# Patient Record
Sex: MALE
[De-identification: names, ages, dates, MRNs, and addresses within clinical notes are randomized per-mention and may not be internally consistent; named-entity substitution may affect disease eponyms.]

---

## 2019-10-10 PROBLEM — Z00.00 ENCOUNTER FOR PREVENTIVE HEALTH EXAMINATION: Status: ACTIVE | Noted: 2019-10-10

## 2019-10-15 ENCOUNTER — APPOINTMENT (OUTPATIENT)
Dept: ORTHOPEDIC SURGERY | Facility: CLINIC | Age: 34
End: 2019-10-15
Payer: COMMERCIAL

## 2019-10-15 VITALS — HEIGHT: 70 IN | WEIGHT: 176 LBS | BODY MASS INDEX: 25.2 KG/M2

## 2019-10-15 DIAGNOSIS — M54.5 LOW BACK PAIN: ICD-10-CM

## 2019-10-15 DIAGNOSIS — M54.16 RADICULOPATHY, LUMBAR REGION: ICD-10-CM

## 2019-10-15 PROCEDURE — 99204 OFFICE O/P NEW MOD 45 MIN: CPT

## 2019-10-15 NOTE — DISCUSSION/SUMMARY
[de-identified] : This patient seems to have sciatica. I recommended getting an MRI. He's had a previous problem 6 years ago. I will call him with the results. He may need formal physical therapy.

## 2019-10-15 NOTE — REVIEW OF SYSTEMS
[Arthralgia] : arthralgia [Joint Pain] : no joint pain [Joint Swelling] : no joint swelling [Joint Stiffness] : joint stiffness [Negative] : Heme/Lymph

## 2019-10-15 NOTE — PHYSICAL EXAM
[Normal] : Gait: normal [de-identified] : Lumbar spine exam shows no warmth or swelling and no tenderness. There is a positive straight leg raising for hamstring and calf pain. Sensory examination is decreased sensation along the lateral aspect of thigh and calf. No motor weakness deep tendon reflexes are 2+ and symmetric there is no pain over the hamstring neurovascular exam is otherwise normal

## 2019-10-15 NOTE — HISTORY OF PRESENT ILLNESS
[de-identified] : Location: right hamstring\par Quality: shooting, burning\par Duration: 08/15/2019\par Context: Atraumatic\par Aggravating Factors: walking, bending, lying down, sitting \par Conservative treatment: OTC PT ( Last session 2 weeks ago) \par Associated Symptoms: Stiffness , numbness ,tingling\par Prior Studies: n.a\par 6 years ago had a back problem treated with injections while in [facility]\par

## 2019-10-31 ENCOUNTER — APPOINTMENT (OUTPATIENT)
Dept: ORTHOPEDIC SURGERY | Facility: CLINIC | Age: 34
End: 2019-10-31
Payer: COMMERCIAL

## 2019-10-31 DIAGNOSIS — S76.301S UNSPECIFIED INJURY OF MUSCLE, FASCIA AND TENDON OF THE POSTERIOR MUSCLE GROUP AT THIGH LEVEL, RIGHT THIGH, SEQUELA: ICD-10-CM

## 2019-10-31 PROCEDURE — 99213 OFFICE O/P EST LOW 20 MIN: CPT

## 2019-10-31 NOTE — PHYSICAL EXAM
[de-identified] : Right leg shows some tenderness in the midsubstance of the hamstring are 5/5 strength. Neurovascular exam is normal.

## 2019-10-31 NOTE — HISTORY OF PRESENT ILLNESS
[de-identified] : Location: right hamstring\par Quality: less pain\par Duration: 08/15/2019\par Context: Atraumatic\par Aggravating Factors: after running\par Conservative treatment: OTC PT ( Last session 2 weeks ago) \par Associated Symptoms: Stiffness , numbness ,tingling\par Prior Studies: n.a\par 6 years ago had a back problem treated with injections while in [facility]\par  [Improving] : improving

## 2019-10-31 NOTE — DISCUSSION/SUMMARY
[de-identified] : This patient has had persistent symptoms going on for 4 months. The MRI of his lumbar spine has been negative. He still has pain and cannot do his normal exercises. I may ask him to get an MRI of his right thigh to look for a hamstring tear. I will call him with the results.

## 2019-11-14 ENCOUNTER — TRANSCRIPTION ENCOUNTER (OUTPATIENT)
Age: 34
End: 2019-11-14